# Patient Record
Sex: MALE | Race: WHITE | URBAN - METROPOLITAN AREA
[De-identification: names, ages, dates, MRNs, and addresses within clinical notes are randomized per-mention and may not be internally consistent; named-entity substitution may affect disease eponyms.]

---

## 2024-01-04 ENCOUNTER — PROCEDURE VISIT (OUTPATIENT)
Dept: SURGERY | Age: 74
End: 2024-01-04
Payer: MEDICARE

## 2024-01-04 VITALS — HEART RATE: 80 BPM | DIASTOLIC BLOOD PRESSURE: 68 MMHG | SYSTOLIC BLOOD PRESSURE: 101 MMHG

## 2024-01-04 DIAGNOSIS — C44.329 SQUAMOUS CELL CARCINOMA OF FOREHEAD: Primary | ICD-10-CM

## 2024-01-04 PROCEDURE — 12052 INTMD RPR FACE/MM 2.6-5.0 CM: CPT | Performed by: DERMATOLOGY

## 2024-01-04 PROCEDURE — 17311 MOHS 1 STAGE H/N/HF/G: CPT | Performed by: DERMATOLOGY

## 2024-01-04 RX ORDER — ASPIRIN 81 MG/1
81 TABLET ORAL DAILY
COMMUNITY

## 2024-01-04 RX ORDER — VITAMIN B COMPLEX
1 CAPSULE ORAL DAILY
COMMUNITY
Start: 2023-01-25

## 2024-01-04 RX ORDER — LISINOPRIL AND HYDROCHLOROTHIAZIDE 25; 20 MG/1; MG/1
1 TABLET ORAL DAILY
COMMUNITY
Start: 2023-08-14

## 2024-01-04 NOTE — PROGRESS NOTES
PRE-PROCEDURE SCREENING    Pacemaker/ICD: No  Difficulty with numbing in the past: No  Local Anesthesia Reaction/passing out: No  Latex or adhesive allergy:  No  Bleeding/Clotting Disorders: No  Anticoagulant Therapy: Yes, preventative asa  Joint prosthesis: No  Artificial Heart Valve: No  Stroke or Seizures: No  Organ Transplant or Lymphoma: No  Immunosuppression: No  Respiratory Problems: No  
    Stage I:  The clinically-apparent tumor was carefully defined and debulked, determining the edge of the surgical excision.    A thin layer of tumor-laden tissue was excised with a narrow margin of normal-appearing skin, using the technique of Mohs.  A map was prepared to correspond to the area of skin from which it was excised.    Hemostasis was achieved using electrosurgery.  The wound was bandaged.     The tissue was prepared for the cryostat and sectioned. 1 section(s) prepared. Each section was coded, cut, and stained for microscopic examination. The entire base and margins of the excised piece of tissue were examined by the surgeon.  The tissue was examined to the level of subcutaneous fat.    No tumor was identified at the peripheral margins of stage I of microscopically controlled surgery.          DEFECT MANAGEMENT:    REPAIR DESCRIPTION:  Various closure modalities were discussed with the patient, and it was decided that an intermediate layered repair would best preserve normal anatomic and functional relationships. Additional risk of wound dehiscence was discussed.     The area was anesthetized with 1% lidocaine with epinephrine 1:100,000 buffered, was given a sterile prep using Chlorhexidine gluconate 4% solution and draped in the usual sterile fashion. Recreation and enlargement of the wound was performed by excising cones of tissue via the triangulation technique.    The final incision lines were placed with respect for the patient's natural skin tension lines in a linear configuration to avoid functional and aesthetic distortion of adjacent free margins. Following minimal undermining, meticulous hemostasis was obtained with spot monopolar electrocoagulation.  Subcutaneous dead space and dermis were closed using 4-0/5-0 Vicryl buried subcutaneous interrupted suture and the epidermis was approximated with   liquid skin adhesive.         WOUND COVERAGE:  The wound was cleaned with normal saline

## 2024-01-04 NOTE — PATIENT INSTRUCTIONS
[facility] Office Hours:    Monday-Thursday  7:30 AM-4:30 PM    Friday  9:00 AM-1:00 PM    POST-OPERATIVE CARE FOR LIQUID SKIN ADHESIVE             Bandage change after 24 hours    During your procedure today, a liquid skin adhesive was used to close the wound. You do not have to have stiches removed. If has a clear to light purple shiny surface. You do not have to have this removed. It will dissolve (melt away) in about 1-2 weeks. Please follow these instructions to help you recover from your procedure and help your wound heal.    CARING FOR YOUR SURGICAL SITE  The bandage should remain on and completely dry for 24 hours. Do NOT get the bandage wet.  After the first 24 hours, gently remove the remaining part of the bandage. It can be helpful to moisten the bandage edges in the shower.   Be gentle around the area of the wound. Do not scrub, rub or pick at the skin glue. It will gradually dissolve in 1-2 weeks.   Do not shave directly over the wound for one week. You can shave around the area.   After one week you can start cleaning the area gently and resume all normal activity. No further restrictions.  Use Sunscreen with SPF of at least 30 on the area around the wound.    If the dressing comes off or if you have questions, or concerns about the dressing, please call the office for instructions!    POST OPERATIVE INSTRUCTIONS    Activity: it is recommended to avoid strenuous activity such as lifting, pushing, pulling, running, power walking or contact sports for at least 2-7 days or as recommended by your provider.  Eating and drinking: Do not drink alcohol for 48 hours after your procedure. Alcohol increases the chances of bleeding.  Medicines   -If you have discomfort, take Acetaminophen (Tylenol or Extra Strength Tylenol). Follow the instructions and warning on the bottle.    Bleeding: If bleeding occurs, Put firm pressure on the area with gauze for 20 minutes without peeking. If the

## 2024-01-05 ENCOUNTER — TELEPHONE (OUTPATIENT)
Dept: SURGERY | Age: 74
End: 2024-01-05

## 2024-01-05 NOTE — TELEPHONE ENCOUNTER
The patient was in the office on 1/4/24 for MOHS procedure located on the Right Inferior Forehead with ILC repair.  The patient tolerated the procedure well and left the office in good condition.    A post-operative telephone call was placed at 10:08 a.m. in order to check on the patient's recovery process.  The patient was not able to be reached and a phone message was left.

## 2024-04-09 ENCOUNTER — TRANSCRIBE ORDERS (OUTPATIENT)
Dept: ADMINISTRATIVE | Age: 74
End: 2024-04-09

## 2024-04-09 DIAGNOSIS — K43.9 ABDOMINAL WALL HERNIA: Primary | ICD-10-CM

## 2024-04-19 NOTE — PROGRESS NOTES
[facility]   Cardiovascular Evaluation          PCP:  Perlita Wing APRN - CNP    Reason for evaluation:   Lower extremity edema    Subjective:   History of present illness on initial date of evaluation:  Tom Collado is a 73 y.o. male with a history of essential hypertension, hyperlipidemia, prediabetes, and tobacco use who presents for further evaluation of lower extremity edema.    The patient reports that he served in the  for several years and started noticing swelling in his feet in 1990 1-1992.  He then started to develop worsening swelling in his lower legs a few years later.  He says that in 2005 while working at [employer], his swelling became more severe and [facility].  Since then, he has had intermittent swelling which he says improves after keeping his legs elevated.  He says that he cannot tolerate wearing compression stockings.  He says that he is physically limited due to his legs and pain with ambulation.  He denies any recent surgeries, long travel, or prolonged immobilization.  He has shortness of breath with exertion.  He also reports difficulty swallowing and says that he has pain with swallowing.  He further notes dizziness with position changes.  He started smoking 1 pack/day at age 17 and quit smoking 7 years ago.  He also quit drinking alcohol 7 years ago.  He reports occasional use of marijuana.  Family history is notable for CAD in his father who had CABG in his 50s.  His mother had hypertension.      Past Medical History:   has a past medical history of Chronic low back pain, Hypertension, and Osteoarthritis.    Surgical History:   has a past surgical history that includes Inguinal hernia repair and Mohs surgery (01/04/2024).     Social History:   reports that he has quit smoking. His smoking use included cigarettes. He has a 17.5 pack-year smoking history. He does not have any smokeless tobacco

## 2024-04-22 ENCOUNTER — OFFICE VISIT (OUTPATIENT)
Dept: CARDIOLOGY CLINIC | Age: 74
End: 2024-04-22

## 2024-04-22 VITALS
BODY MASS INDEX: 37.22 KG/M2 | OXYGEN SATURATION: 97 % | HEART RATE: 90 BPM | WEIGHT: 290 LBS | DIASTOLIC BLOOD PRESSURE: 70 MMHG | HEIGHT: 74 IN | SYSTOLIC BLOOD PRESSURE: 105 MMHG

## 2024-04-22 DIAGNOSIS — E66.09 CLASS 2 OBESITY DUE TO EXCESS CALORIES WITHOUT SERIOUS COMORBIDITY WITH BODY MASS INDEX (BMI) OF 37.0 TO 37.9 IN ADULT: ICD-10-CM

## 2024-04-22 DIAGNOSIS — Z79.899 MEDICATION MANAGEMENT: ICD-10-CM

## 2024-04-22 DIAGNOSIS — I10 ESSENTIAL HYPERTENSION: ICD-10-CM

## 2024-04-22 DIAGNOSIS — R60.9 EDEMA, UNSPECIFIED TYPE: ICD-10-CM

## 2024-04-22 DIAGNOSIS — Z76.89 ESTABLISHING CARE WITH NEW DOCTOR, ENCOUNTER FOR: Primary | ICD-10-CM

## 2024-04-22 DIAGNOSIS — Z82.49 FAMILY HISTORY OF CORONARY ARTERY DISEASE IN FATHER: ICD-10-CM

## 2024-04-22 DIAGNOSIS — R06.09 DYSPNEA ON EXERTION: ICD-10-CM

## 2024-04-22 DIAGNOSIS — R13.10 ODYNOPHAGIA: ICD-10-CM

## 2024-04-22 DIAGNOSIS — R06.02 SOB (SHORTNESS OF BREATH) ON EXERTION: ICD-10-CM

## 2024-04-22 DIAGNOSIS — R13.10 DYSPHAGIA, UNSPECIFIED TYPE: ICD-10-CM

## 2024-04-22 DIAGNOSIS — I10 HYPERTENSION, UNSPECIFIED TYPE: ICD-10-CM

## 2024-04-22 DIAGNOSIS — E78.5 HYPERLIPIDEMIA, UNSPECIFIED HYPERLIPIDEMIA TYPE: ICD-10-CM

## 2024-04-22 DIAGNOSIS — R60.0 LOWER EXTREMITY EDEMA: ICD-10-CM

## 2024-04-22 DIAGNOSIS — R42 DIZZINESS: ICD-10-CM

## 2024-04-22 DIAGNOSIS — I44.0 FIRST DEGREE AV BLOCK: ICD-10-CM

## 2024-04-22 DIAGNOSIS — Z87.891 FORMER SMOKER: ICD-10-CM

## 2024-04-22 PROBLEM — M79.604 PAIN IN BOTH LOWER EXTREMITIES: Status: ACTIVE | Noted: 2024-04-22

## 2024-04-22 PROBLEM — D09.9 SQUAMOUS CELL CARCINOMA IN SITU: Status: ACTIVE | Noted: 2024-04-22

## 2024-04-22 PROBLEM — Z86.79 HISTORY OF VARICOSE VEINS: Status: ACTIVE | Noted: 2024-04-22

## 2024-04-22 PROBLEM — M79.605 PAIN IN BOTH LOWER EXTREMITIES: Status: ACTIVE | Noted: 2024-04-22

## 2024-04-22 PROBLEM — I89.0 LYMPHEDEMA: Status: ACTIVE | Noted: 2024-04-22

## 2024-04-22 LAB
ALBUMIN SERPL-MCNC: 4.3 G/DL (ref 3.4–5)
ALBUMIN/GLOB SERPL: 1.9 {RATIO} (ref 1.1–2.2)
ALP SERPL-CCNC: 67 U/L (ref 40–129)
ALT SERPL-CCNC: 11 U/L (ref 10–40)
ANION GAP SERPL CALCULATED.3IONS-SCNC: 11 MMOL/L (ref 3–16)
AST SERPL-CCNC: 12 U/L (ref 15–37)
BILIRUB SERPL-MCNC: <0.2 MG/DL (ref 0–1)
BUN SERPL-MCNC: 20 MG/DL (ref 7–20)
CALCIUM SERPL-MCNC: 9.6 MG/DL (ref 8.3–10.6)
CHLORIDE SERPL-SCNC: 103 MMOL/L (ref 99–110)
CO2 SERPL-SCNC: 28 MMOL/L (ref 21–32)
CREAT SERPL-MCNC: 0.9 MG/DL (ref 0.8–1.3)
GFR SERPLBLD CREATININE-BSD FMLA CKD-EPI: >90 ML/MIN/{1.73_M2}
GLUCOSE SERPL-MCNC: 110 MG/DL (ref 70–99)
MAGNESIUM SERPL-MCNC: 2.1 MG/DL (ref 1.8–2.4)
NT-PROBNP SERPL-MCNC: 40 PG/ML (ref 0–124)
POTASSIUM SERPL-SCNC: 4.1 MMOL/L (ref 3.5–5.1)
PROT SERPL-MCNC: 6.6 G/DL (ref 6.4–8.2)
SODIUM SERPL-SCNC: 142 MMOL/L (ref 136–145)

## 2024-04-22 RX ORDER — TORSEMIDE 20 MG/1
20 TABLET ORAL DAILY
Qty: 30 TABLET | Refills: 3 | Status: SHIPPED | OUTPATIENT
Start: 2024-04-22 | End: 2024-04-26

## 2024-04-22 RX ORDER — LISINOPRIL 20 MG/1
20 TABLET ORAL DAILY
Qty: 30 TABLET | Refills: 3 | Status: SHIPPED | OUTPATIENT
Start: 2024-04-22 | End: 2024-04-26

## 2024-04-22 NOTE — PATIENT INSTRUCTIONS
Your provider has ordered testing for further evaluation.  An order/prescription has been included in your paper work.   To schedule outpatient testing, contact Central Scheduling by [facility] ([phone]).       GXT or Lexiscan Myoview Stress Test instructions:   -Allow 3-4 hours for the entire test to be complete.  -Nothing to eat or drink after midnight prior to testing. May take prescribed medications in morning with sip of water.  -Hold diabetes medication and Insulin morning of testing. Bring glucose meter and glucose tablet if available.   -Do not drink or eat anything containing caffeine 24 hours prior to test. This includes coffee, decaffeinated coffee, chocolate, soda, or tea.    1. Discussed referral to Dr.Adam Alicia ~ varicose veins and lymphedema ~ patient deffered at this time.   2. Discontinue (STOP) lisinopril- hydrochlorothiazide   3. Start lisinopril 20 mg daily  4. Start torsemide 20 mg daily   5. Patient will be started on new medication Torsemide. Patient verbalizes understanding of the need for treatment and education provided at today's visit. Additional education materials will be provided in the AVS.    3. Educated importance of elevating lower extremities and usage of compression stockings  4. Order venous doppler lower extremities ~ assess for deep vein thrombosis  5. Order Lexiscan stress myoview ~ shortness of breath on exertion   6. Order lab work CMP, magnesium, and BNP now and in 1 week BMP and magnesium   7. Order echocardiogram ~ shortness of breath on exertion, lower leg pain, dizziness   8.  Discussed referral to Gastroenterology ~ esophogeal and swallowing complaints, [facility]   9.  Discussed salt restriction 2 grams daily and fluid restriction of 2 liters daily.   10. Follow up in 3 months

## 2024-04-24 NOTE — TELEPHONE ENCOUNTER
4/24- called pt @[phone]. Unable to make contact. Received an automated msg that mail box was full. Unable to lvm. I also attempted to call pt @[phone]. The number went straight to . Lvm informing pt to call back to schedule 3M f/u with [facility] for continuation of med refills.       4/22/2024 [facility] (fyi- apt was at [place])

## 2024-04-24 NOTE — TELEPHONE ENCOUNTER
4/22/2024 [facility]  No upcoming appt  4/22/2024 cmp, bnp      Per [facility] follow up in 3 months  Please contact pt to schedule appt.

## 2024-04-26 ENCOUNTER — TELEPHONE (OUTPATIENT)
Dept: CARDIOLOGY CLINIC | Age: 74
End: 2024-04-26

## 2024-04-26 RX ORDER — LISINOPRIL 20 MG/1
20 TABLET ORAL DAILY
Qty: 90 TABLET | Refills: 3 | Status: SHIPPED | OUTPATIENT
Start: 2024-04-26

## 2024-04-26 RX ORDER — TORSEMIDE 20 MG/1
20 TABLET ORAL DAILY
Qty: 90 TABLET | Refills: 3 | Status: SHIPPED | OUTPATIENT
Start: 2024-04-26

## 2024-04-26 NOTE — TELEPHONE ENCOUNTER
Pt called in another encounter regarding 1 week lab orders.     4/22/24 appt plan    Order lab work CMP, magnesium, and BNP now and in 1 week BMP and magnesium     Labs done on 4/22/24 no orders for labs needed on 5/1, ok to place orders KM?

## 2024-04-26 NOTE — TELEPHONE ENCOUNTER
4/26- called pt @[phone]. Pt is now scheduled 7/29/2024 [facility] for 3M f/u. Pt stated he was told to come back in a week for more lab work. I reviewed pt's chart. Pt was seen 4/22 and per [facility] AVS pt \"Order lab work CMP, magnesium, and BNP now and in 1 week BMP and magnesium\" There is currently no active orders in pt's chart for labs. Please advise if needed.

## 2024-04-29 ENCOUNTER — TELEPHONE (OUTPATIENT)
Dept: CARDIOLOGY CLINIC | Age: 74
End: 2024-04-29

## 2024-04-29 DIAGNOSIS — Z79.899 MEDICATION MANAGEMENT: Primary | ICD-10-CM

## 2024-04-29 NOTE — TELEPHONE ENCOUNTER
Patient's labs show normal kidney function and electrolyte levels and normal liver function.  Pro-BNP is within normal range.    Needs repeat BMP and magnesium level one week after starting torsemide.

## 2024-04-29 NOTE — TELEPHONE ENCOUNTER
Attempted to call patient to relay [facility] result message and recommendations. Repeat labs ordered. VM full at this time; will re attempt at a later time.

## 2024-04-29 NOTE — TELEPHONE ENCOUNTER
----- Message from Russell Steve DO sent at 4/29/2024  8:34 AM EDT -----  Patient's labs show normal kidney function and electrolyte levels and normal liver function.  Pro-BNP is within normal range.    Needs repeat BMP and magnesium level one week after starting torsemide.

## 2024-07-08 ENCOUNTER — TELEPHONE (OUTPATIENT)
Dept: CARDIOLOGY CLINIC | Age: 74
End: 2024-07-08

## 2024-07-08 NOTE — TELEPHONE ENCOUNTER
Called patient to remind him of blood work, and testing ordered at prior office visit. Patient stated that he was not going to have a stress test done, and when he went to schedule and echo it was too expensive.  [facility] and wishes to Cancell his apt with Dr. Steve on 7/29/24. Will cancel apt. Pt advised to call office back as needed.